# Patient Record
Sex: MALE | Race: WHITE | Employment: FULL TIME | URBAN - NONMETROPOLITAN AREA
[De-identification: names, ages, dates, MRNs, and addresses within clinical notes are randomized per-mention and may not be internally consistent; named-entity substitution may affect disease eponyms.]

---

## 2018-04-02 ENCOUNTER — HOSPITAL ENCOUNTER (OUTPATIENT)
Age: 53
Setting detail: OUTPATIENT SURGERY
Discharge: HOME | End: 2018-04-02
Attending: INTERNAL MEDICINE | Admitting: INTERNAL MEDICINE
Payer: OTHER GOVERNMENT

## 2018-04-02 VITALS
RESPIRATION RATE: 16 BRPM | OXYGEN SATURATION: 96 % | TEMPERATURE: 98.1 F | SYSTOLIC BLOOD PRESSURE: 133 MMHG | BODY MASS INDEX: 44.1 KG/M2 | WEIGHT: 315 LBS | HEIGHT: 71 IN | HEART RATE: 60 BPM | DIASTOLIC BLOOD PRESSURE: 86 MMHG

## 2018-04-02 PROCEDURE — 2720000010 HC SURG SUPPLY STERILE: Performed by: INTERNAL MEDICINE

## 2018-04-02 PROCEDURE — 3609017800 HC EGD ESOPHAGEAL MANOMETRY AND PH MONITORING 24 HR

## 2018-04-02 PROCEDURE — 6370000000 HC RX 637 (ALT 250 FOR IP)

## 2018-04-02 PROCEDURE — 3609015500 HC GASTRIC/DUODENAL MOTILITY &/OR MANOMETRY STUDY: Performed by: INTERNAL MEDICINE

## 2018-04-02 RX ORDER — MAGNESIUM HYDROXIDE/ALUMINUM HYDROXICE/SIMETHICONE 120; 1200; 1200 MG/30ML; MG/30ML; MG/30ML
5 SUSPENSION ORAL DAILY
COMMUNITY

## 2018-04-02 RX ORDER — RANITIDINE 150 MG/1
150 TABLET ORAL 2 TIMES DAILY
COMMUNITY

## 2018-04-02 RX ORDER — PANTOPRAZOLE SODIUM 20 MG/1
20 TABLET, DELAYED RELEASE ORAL 2 TIMES DAILY
COMMUNITY

## 2018-04-02 ASSESSMENT — PAIN - FUNCTIONAL ASSESSMENT: PAIN_FUNCTIONAL_ASSESSMENT: 0-10

## 2018-04-02 NOTE — PROGRESS NOTES
Patient admitted to Endoscopy for EFT and 24 hours PH, consent signed. Manometry  Probe passed through right are and into the stomach. Liquid swallows at 52 cm. Comfortec  Ph probe Lot [id number] inserted into right nare and advanced into the stomach. Gastric ph 7.5  Probe pulled back to 42 cm and secured into place. Discharge instructions given, verbalized understanding, verbalized understanding. Patient tolerated well and discharge home per self.

## 2018-04-10 NOTE — PROCEDURES
[address] [place]  [address]                Impedance-pH Monitoring Report (On PPI)     Patient Data   Clinician Data  Patient Name: Chip Padilla  Patient ID: [id number] Physician: DR. Aparna Drake  Patient Sex: Male Referring Physician: DR. Mila Pelletier  Date of Birth: [id number] Performed by: Endo Nurses  Date of Admission: 2018-04-02  Visit ID:     Procedure Data  Procedure Start: 2018-04-02 10:55:45  Procedure Duration: 23:53:38  Catheter Depth: N/A    Reflux Analysis Settings  Analysis Duration Upright: 19:24:27  Analysis Duration Recumbent: 00:00:00  Analysis Duration Total: 19:24:27    Patient History  Symptom(s):Heart Racing, acid taste      Medications:     Impressions  Acid exposure indicates - no acid reflux  Deemeester score indicates - no acid reflux  Impedence indicates - nonacid reflux  Symptom Index (SI) correlates with nonacid reflux   Symptom Association Probability (SAP) correlates with nonacid reflux     Exam consistent with:  NONACID REFLUX    Recommendations  Consider adding carafate, bethanechol or baclofen  Fundoplication is also an option       Nikki Ho MD  2:20 PM 4/10/2018
breaks          % large break (20%) is greater than 20%  Finding: Weak Peristalsis With Large Defects  * Findings are based on published [place] Classification scheme and are only intended to serve as a guide for patient diagnosis     Procedure   After confirmation of potential allergies, a topical analgesic was used to numb the nares followed by trans-nasal insertion of a High Resolution Manometry Catheter. Pressure bands of both UES and LES were observed on the color contour. Patient instructed to take deep breath to verify placement of catheter; diaphragmatic pinch noted on inspiration. Patient was assisted to supine position and catheter was stabilized. Patient encouraged to relax while acclimating to catheter for approximately 5 minutes. A 30 second baseline pressure was obtained to identify the UES and LES followed by a series of wet swallows using 5 ccs room temperature water to assess esophageal motility. At the conclusion of the procedure; the catheter was removed. Indications   Chronic GERD     Interpretation / Findings   UES Pressure and relaxation:  normal  Peristalsis: large breaks  Esophageal pressures:  normal  GEJ relaxation:  complete  Incomplete bolus clearance: <10%     Impressions   Weak Peristalsis wth normal GEJ relaxation and large deficits  This is a borderline motor function disorder likely related to GERD.     Recommend aggressive treatment of GERD and consider retesting if symptoms persist.        Johnna Domínguez MD  2:19 PM 4/10/2018

## (undated) DEVICE — 3M™ TEGADERM™ TRANSPARENT FILM DRESSING FRAME STYLE, 1624W, 2-3/8 IN X 2-3/4 IN (6 CM X 7 CM), 100/CT 4CT/CASE: Brand: 3M™ TEGADERM™

## (undated) DEVICE — PROBE 2 CHAN PH PRB F/ ACID REFLX COMFORTEC M11 18CM DISP

## (undated) DEVICE — SOLUTION IV IRRIG POUR BRL 0.9% SODIUM CHL 2F7124

## (undated) DEVICE — DEVICE STBL AD NG ATTCH SKIN PREP STATLOK

## (undated) DEVICE — SOLUTION IV IRRIG WATER 1000ML POUR BRL 2F7114